# Patient Record
Sex: MALE
[De-identification: names, ages, dates, MRNs, and addresses within clinical notes are randomized per-mention and may not be internally consistent; named-entity substitution may affect disease eponyms.]

---

## 2021-06-18 ENCOUNTER — HOSPITAL ENCOUNTER (EMERGENCY)
Dept: HOSPITAL 56 - MW.ED | Age: 31
Discharge: HOME | End: 2021-06-18
Payer: SELF-PAY
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
## 2021-06-18 DIAGNOSIS — S05.01XA: Primary | ICD-10-CM

## 2021-06-18 PROCEDURE — 99283 EMERGENCY DEPT VISIT LOW MDM: CPT

## 2021-06-18 NOTE — EDM.PDOC
ED HPI GENERAL MEDICAL PROBLEM





- General


Chief Complaint: Eye Problems


Stated Complaint: RT EYE


Time Seen by Provider: 06/18/21 11:12


Source of Information: Reports: Patient


History Limitations: Reports: No Limitations





- History of Present Illness


INITIAL COMMENTS - FREE TEXT/NARRATIVE: 


HISTORY AND PHYSICAL:





History of present illness:


Patient is a 30-year-old male who presents to the emergency room with complaints

of sensation of foreign body in the right eye.  He states he was out in the yard

when he felt irritation like "something in it".  Since then he has had watery di

scharge and irritation.  He denies any blurred vision, double vision, etc... He 

does not wear contact lenses or glasses.  He offers no systemic complaints.





Review of systems: 


As per history of present illness and below otherwise all systems reviewed and 

negative.





Past medical history: 


As per history of present illness and as reviewed below otherwise 

noncontributory.





Surgical history: 


As per history of present illness and as reviewed below otherwise 

noncontributory.





Social history: 


See social history for further information





Family history: 


As per history of present illness and as reviewed below otherwise 

noncontributory.





Physical exam:


General: Well developed and well nourished. Alert and orientated x 3. Nontoxic 

in appearance and in no acute distress. Vital signs are stable and have been 

reviewed by me. Nursing notes were reviewed. 


HEENT: Atraumatic, normocephalic, pupils equal and reactive bilaterally, 

negative for conjunctival pallor or scleral icterus, scleral injection noted to 

the right eye  (please see note).  His mucous membranes moist, TMs normal 

bilaterally, throat clear, neck supple, nontender, trachea midline. No drooling 

or trismus noted. No meningeal signs. No hot potato voice noted. 


Lungs: Clear to auscultation bilaterally. Normal work of breathing, no accessory

muscles used.


Heart: S1S2, regular rate and rhythm without overt murmur, gallops, or rubs. No 

JVD. No peripheral edema


Abdomen: Soft, nondistended, nontender. 


Skin: Intact, warm, dry. No lesions or rashes noted.


Hematologic: No petechiae or purpra. Mucosa appropriate color and normal nail 

bed color and refill.


Extremities: Atraumatic, moves all extremities per self without difficulty or 

deficits, negative for cords or calf pain. Neurovascular unremarkable.


Neuro: Awake, alert, oriented. Cranial nerves II through XII unremarkable. 

Cerebellum unremarkable. Motor and sensory unremarkable throughout. Exam 

nonfocal.


Psychiatric: Mood and affect are appropriate.  Normal thought process. Answering

questions appropriately.





Notes:


*This patient was seen and evaluated during the 2020 SARS-CoV-2 novel 

coronavirus pandemic period.  Community viral transmission is ongoing at time of

this encounter and the emergency department is operating under pandemic response

procedures.





Tetracaine was used to anesthetize the eye for eye exam.  No obvious foreign 

body is noted.  I did gently wipe with a Q-tip to the upper and under lid 

without foreign body extraction.  Fluorescein eye exam reveals a corneal 

abrasion to the 5 to 8 o'clock position.  Patient reports he has a sensation of 

a foreign body in the eye, Brandon lens was used for irrigation.  Patient 

tolerated well.  I have talked with the patient about today's findings, in 

addition to providing specific details for plan of care.  Reassessment at the 

time of disposition demonstrates that the patient is in no acute distress.  The 

patient is stable for discharge, counseling was provided and we discussed in 

great detail signs and symptoms that would prompt them to return to the 

Emergency Department. Medication, follow up and supportive care measures were 

reviewed and discussed. Voices understanding and is agreeable to plan of care. 

Denies any further questions or concerns at this time.





Diagnostics:


None





Therapeutics:


Tetracaine, erythromycin ointment





Prescription:


Ocufloxin





Impression: 


Corneal abrasion





Plan:


1.  You were evaluated today on an emergent basis.  Please use the eyedrops as 

directed.


2.  You can alternate Tylenol and ibuprofen as needed for pain and fever 

management.


3. We encourage you to follow up with ophthalmologist next few days for re-

evaluation and further care/management. 


4. If your symptoms should worsen, new symptoms develop or any of the signs and 

symptoms we discussed should arise please return to the emergency room or call 

911 (if needed).





Definitive disposition and diagnosis as appropriate pending reevaluation and 

review of above.





  ** Right Eye


Pain Score (Numeric/FACES): 3





- Related Data


                                    Allergies











Allergy/AdvReac Type Severity Reaction Status Date / Time


 


No Known Allergies Allergy   Verified 06/18/21 11:34











Home Meds: 


                                    Home Meds





Ofloxacin [Ocuflox 0.3% Ophth Soln] 2 drop EYERT QID 5 Days #1 bottle 06/18/21 

[Rx]











ED ROS GENERAL





- Review of Systems


Review Of Systems: Comprehensive ROS is negative, except as noted in HPI.





ED EXAM GENERAL W FULL EYE





- Physical Exam


Exam: See Below (See dictation)





Course





- Vital Signs


Last Recorded V/S: 


                                Last Vital Signs











Temp  98 F   06/18/21 11:34


 


Pulse  71   06/18/21 11:34


 


Resp  16   06/18/21 11:34


 


BP  118/72   06/18/21 11:34


 


Pulse Ox  98   06/18/21 11:34














- Orders/Labs/Meds


Orders: 


                               Active Orders 24 hr











 Category Date Time Status


 


 Visual Acuity [Vision Test] [RC] ASDIRECTED Care  06/18/21 11:17 Ordered











Meds: 


Medications














Discontinued Medications














Generic Name Dose Route Start Last Admin





  Trade Name José Luisq  PRN Reason Stop Dose Admin


 


Erythromycin  1 gm  06/18/21 11:42 





  Erythromycin Base 0.5% Ophth Oint 1 Gm Tube  EYERT  06/18/21 11:43 





  ONETIME ONE  


 


Tetracaine HCl  1 ml  06/18/21 11:17 





  Tetracaine Hcl/Pf 0.5% 4 Ml Bottle  EYEBOTH  06/18/21 11:18 





  ASDIRECTED ONE  














Departure





- Departure


Time of Disposition: 11:59


Disposition: Home, Self-Care 01


Clinical Impression: 


 Corneal abrasion








- Discharge Information


Prescriptions: 


Ofloxacin [Ocuflox 0.3% Ophth Soln] 2 drop EYERT QID 5 Days #1 bottle


Instructions:  Corneal Abrasion, Easy-to-Read


Forms:  ED Department Discharge


Additional Instructions: 


The following information is given to patients seen in the emergency department 

who are being discharged to home. This information is to outline your options 

for follow-up care. We provide all patients seen in our emergency department 

with a follow-up referral.





The need for follow-up, as well as the timing and circumstances, are variable 

depending upon the specifics of your emergency department visit.





If you don't have a primary care physician on staff, we will provide you with a 

referral. We always advise you to contact your personal physician following an 

emergency department visit to inform them of the circumstance of the visit and 

for follow-up with them and/or the need for any referrals to a consulting 

specialist.





The emergency department will also refer you to a specialist when appropriate. 

This referral assures that you have the opportunity for follow-up care with a 

specialist. All of these measure are taken in an effort to provide you with 

optimal care, which includes your follow-up.





Under all circumstances we always encourage you to contact your private 

physician who remains a resource for coordinating your care. When calling for 

follow-up care, please make the office aware that this follow-up is from your 

recent emergency room visit. If for any reason you are refused follow-up, please

contact the [facility] Emergency Department

at [phone] and asked to speak to the emergency department charge nurse.





[facility]


Primary Care


[address] [place] [id number]


Phone: [phone]


Fax: [phone]





[address] [address]


Phone: [phone]


Fax: [phone]





Thank you for choosing the [facility] emergency department in 

[place] for your medical needs today.  It was a pleasure caring for you. Today

you were seen in the emergency department for eye irritation.





1.  You were evaluated today on an emergent basis.  Please use the eyedrops as 

directed.


2.  You can alternate Tylenol and ibuprofen as needed for pain and fever 

management.


3. We encourage you to follow up with ophthalmologist next few days for re-

evaluation and further care/management. 


4. If your symptoms should worsen, new symptoms develop or any of the signs and 

symptoms we discussed should arise please return to the emergency room or call 

911 (if needed).





Sepsis Event Note (ED)





- Focused Exam


Vital Signs: 


                                   Vital Signs











  Temp Pulse Resp BP Pulse Ox


 


 06/18/21 11:34  98 F  71  16  118/72  98














- My Orders


Last 24 Hours: 


My Active Orders





06/18/21 11:17


Visual Acuity [Vision Test] [RC] ASDIRECTED 














- Assessment/Plan


Last 24 Hours: 


My Active Orders





06/18/21 11:17


Visual Acuity [Vision Test] [RC] ASDIRECTED